# Patient Record
Sex: MALE | Race: WHITE | NOT HISPANIC OR LATINO | Employment: FULL TIME | RURAL
[De-identification: names, ages, dates, MRNs, and addresses within clinical notes are randomized per-mention and may not be internally consistent; named-entity substitution may affect disease eponyms.]

---

## 2020-12-09 ENCOUNTER — HISTORICAL (OUTPATIENT)
Dept: ADMINISTRATIVE | Facility: HOSPITAL | Age: 35
End: 2020-12-09

## 2020-12-09 LAB — SARS-COV+SARS-COV-2 AG RESP QL IA.RAPID: NEGATIVE

## 2022-06-27 ENCOUNTER — HOSPITAL ENCOUNTER (OUTPATIENT)
Dept: RADIOLOGY | Facility: HOSPITAL | Age: 37
Discharge: HOME | End: 2022-06-27
Attending: ORTHOPAEDIC SURGERY
Payer: COMMERCIAL

## 2022-06-27 DIAGNOSIS — M79.672 ACUTE FOOT PAIN, LEFT: ICD-10-CM

## 2022-06-27 PROCEDURE — 73630 X-RAY EXAM OF FOOT: CPT | Mod: TC,LT

## 2024-04-26 ENCOUNTER — TELEPHONE (OUTPATIENT)
Dept: ORTHOPEDICS | Facility: CLINIC | Age: 39
End: 2024-04-26
Payer: COMMERCIAL

## 2024-04-26 NOTE — TELEPHONE ENCOUNTER
Will schedule with NP to evaluate new left foot pain.    ----- Message from Katherine Akins sent at 4/25/2024  3:13 PM CDT -----  Regarding: hematoma  Pt said he have a hematoma on his left foot , do Dr Man do that. Please call pt @ [phone]

## 2024-04-30 ENCOUNTER — HOSPITAL ENCOUNTER (OUTPATIENT)
Dept: RADIOLOGY | Facility: HOSPITAL | Age: 39
Discharge: HOME | End: 2024-04-30
Payer: COMMERCIAL

## 2024-04-30 ENCOUNTER — OFFICE VISIT (OUTPATIENT)
Dept: ORTHOPEDICS | Facility: CLINIC | Age: 39
End: 2024-04-30
Payer: COMMERCIAL

## 2024-04-30 DIAGNOSIS — M79.672 ACUTE FOOT PAIN, LEFT: ICD-10-CM

## 2024-04-30 DIAGNOSIS — M79.672 LEFT FOOT PAIN: Primary | ICD-10-CM

## 2024-04-30 DIAGNOSIS — M79.672 ACUTE FOOT PAIN, LEFT: Primary | ICD-10-CM

## 2024-04-30 PROCEDURE — 73630 X-RAY EXAM OF FOOT: CPT | Mod: TC,LT

## 2024-04-30 PROCEDURE — 99213 OFFICE O/P EST LOW 20 MIN: CPT | Mod: S$GLB,,,

## 2024-04-30 PROCEDURE — 73630 X-RAY EXAM OF FOOT: CPT | Mod: 26,LT,, | Performed by: STUDENT IN AN ORGANIZED HEALTH CARE EDUCATION/TRAINING PROGRAM

## 2024-04-30 PROCEDURE — 1159F MED LIST DOCD IN RCRD: CPT | Mod: S$GLB,,,

## 2024-04-30 PROCEDURE — 99212 OFFICE O/P EST SF 10 MIN: CPT | Mod: PBBFAC,25

## 2024-04-30 NOTE — PROGRESS NOTES
CC:   Chief Complaint   Patient presents with    Left Foot - Pain          Noel Pereira is a 39 y.o. male seen today for follow up of Pain of the Left Foot  Patient is known to Dr. Man, last seen in June of 2022 with acute pain of the left foot.  At that time an MRI of his left foot was ordered with out any acute injury and ligament or tendon, screws from previous surgery in place.  Patient states last week he was walking at work when a bruise developed over the incision site in the inside of his left foot.  Patient denies any sharp pain in the left foot however he does state that there is an uncomfortable feeling where the bruise was.  No other complaints today.      PAST MEDICAL HISTORY: No past medical history on file.     PAST SURGICAL HISTORY: No past surgical history on file.     ALLERGIES:   Review of patient's allergies indicates:   Allergen Reactions    Penicillin g sodium Rash        MEDICATIONS :    Current Outpatient Medications:     meloxicam (MOBIC) 15 MG tablet, meloxicam 15 mg tablet, Disp: , Rfl:      SOCIAL HISTORY:   Social History     Socioeconomic History    Marital status: Unknown   Tobacco Use    Smoking status: Never    Smokeless tobacco: Never   Substance and Sexual Activity    Alcohol use: Not Currently    Drug use: Never        FAMILY HISTORY: No family history on file.       PHYSICAL EXAM:      There were no vitals filed for this visit.  There is no height or weight on file to calculate BMI.    GENERAL: Well-developed, well-nourished male . The patient is alert, oriented and cooperative.    EXTREMITIES:  Left foot was skin clean dry and intact, no swelling, nontender to palpation at medial previous incision site, good range of motion with dorsiflexion and plantar flexion of the foot without pain, capillary refill less than 3 seconds, neurovascularly intact      RADIOGRAPHIC FINDINGS:   X-Ray Foot Complete Left    Result Date: 4/30/2024  EXAMINATION: XR FOOT  COMPLETE AP VIEW LEFT CLINICAL HISTORY: Pain in left foot TECHNIQUE: XR FOOT COMPLETE AP VIEW LEFT COMPARISON: 2022 FINDINGS: No acute fracture or dislocation. Metallic hardware located in the navicular bone, unchanged. No radiopaque foreign bodies.     No acute fracture or dislocation. Metallic hardware located in the navicular bone, unchanged. No radiopaque foreign bodies. Electronically signed by: Mason Conway Date:    04/30/2024 Time:    11:47       There are no problems to display for this patient.    IMPRESSION AND PLAN:  Left foot pain.  Pain due to hardware.  Personally reviewed x-rays today with hardware within navicular bone in place unchanged compared to previous x-ray in June of 2022, no acute fracture or dislocation.  Discussed all treatment options with the patient today.  Discussed oral/topical NSAIDs for pain relief.  Discussed RICE either therapy.  Discussed follow up with Dr. Man in approximately 2 weeks if he continues to have pain or the pain worsens.  Otherwise p.r.n..        No follow-ups on file.       Karlie Hector PA-C      (Subject to voice recognition error, transcription service not allowed)